# Patient Record
Sex: FEMALE | URBAN - NONMETROPOLITAN AREA
[De-identification: names, ages, dates, MRNs, and addresses within clinical notes are randomized per-mention and may not be internally consistent; named-entity substitution may affect disease eponyms.]

---

## 2020-10-26 ENCOUNTER — OFFICE (OUTPATIENT)
Dept: URBAN - NONMETROPOLITAN AREA CLINIC 14 | Facility: CLINIC | Age: 20
End: 2020-10-26

## 2020-10-26 DIAGNOSIS — R12 HEARTBURN: ICD-10-CM

## 2020-10-26 DIAGNOSIS — R10.13 EPIGASTRIC PAIN: ICD-10-CM

## 2020-10-26 PROCEDURE — 99203 OFFICE O/P NEW LOW 30 MIN: CPT

## 2020-11-02 ENCOUNTER — OFFICE (OUTPATIENT)
Dept: URBAN - METROPOLITAN AREA CLINIC 19 | Facility: CLINIC | Age: 20
End: 2020-11-02
Payer: COMMERCIAL

## 2020-11-02 VITALS
OXYGEN SATURATION: 99 % | SYSTOLIC BLOOD PRESSURE: 132 MMHG | HEART RATE: 76 BPM | DIASTOLIC BLOOD PRESSURE: 82 MMHG | HEIGHT: 69 IN | WEIGHT: 224 LBS

## 2020-11-02 DIAGNOSIS — R19.4 CHANGE IN BOWEL HABIT: ICD-10-CM

## 2020-11-02 DIAGNOSIS — R10.13 EPIGASTRIC PAIN: ICD-10-CM

## 2020-11-02 PROCEDURE — 99204 OFFICE O/P NEW MOD 45 MIN: CPT

## 2020-11-02 NOTE — SERVICEHPINOTES
19-year-old single black female from [place], [place] here for complaints of intermittent epigastric pain and change in bowel habits.  Symptoms began 2 or 3 weeks ago with what initially she thought was chest pain. The chest pain then “dropped” into her stomach and epigastric area.  Often her pain is worse after she eats, but is intermittent.  She went to the ED at [facility] on 10/21/20 for routine lab was unremarkable and CT abdomen/pelvis was normal. She was given Protonix 40 mg daily and discharged.  She did start some unnamed antibiotics that she had left over after she had her wisdom teeth removed.  She denies any improvement in her symptoms.  She has had a mild change in her bowel habits with increased frequency and urgency in 2-3 “loose” stools daily.  She denies dysphagia or overt GI bleeding.  Family history is negative for colon neoplasm.

## 2020-11-02 NOTE — SERVICENOTES
The patient's assessment was reviewed with Dr. Figueredo and a collaborative plan of care was established.